# Patient Record
Sex: FEMALE | Race: ASIAN | NOT HISPANIC OR LATINO | Employment: STUDENT | URBAN - METROPOLITAN AREA
[De-identification: names, ages, dates, MRNs, and addresses within clinical notes are randomized per-mention and may not be internally consistent; named-entity substitution may affect disease eponyms.]

---

## 2017-11-20 ENCOUNTER — OFFICE VISIT (OUTPATIENT)
Dept: URGENT CARE | Facility: CLINIC | Age: 22
End: 2017-11-20
Payer: COMMERCIAL

## 2017-11-20 VITALS
DIASTOLIC BLOOD PRESSURE: 78 MMHG | OXYGEN SATURATION: 97 % | HEIGHT: 63 IN | BODY MASS INDEX: 37.21 KG/M2 | TEMPERATURE: 97.7 F | WEIGHT: 210 LBS | HEART RATE: 98 BPM | SYSTOLIC BLOOD PRESSURE: 116 MMHG | RESPIRATION RATE: 16 BRPM

## 2017-11-20 DIAGNOSIS — J30.9 ALLERGIC RHINITIS, UNSPECIFIED CHRONICITY, UNSPECIFIED SEASONALITY, UNSPECIFIED TRIGGER: ICD-10-CM

## 2017-11-20 DIAGNOSIS — R05.8 POST-VIRAL COUGH SYNDROME: ICD-10-CM

## 2017-11-20 DIAGNOSIS — J06.9 VIRAL UPPER RESPIRATORY TRACT INFECTION: ICD-10-CM

## 2017-11-20 PROCEDURE — 99203 OFFICE O/P NEW LOW 30 MIN: CPT | Performed by: EMERGENCY MEDICINE

## 2017-11-20 ASSESSMENT — ENCOUNTER SYMPTOMS
SHORTNESS OF BREATH: 0
DIARRHEA: 0
VOMITING: 0
CHILLS: 0
HEARTBURN: 0
HEADACHES: 0
MYALGIAS: 0
FEVER: 0
NAUSEA: 0
RHINORRHEA: 1
SORE THROAT: 1
HEMOPTYSIS: 0
WHEEZING: 1
COUGH: 1
SINUS PAIN: 0
SPUTUM PRODUCTION: 1

## 2017-11-20 NOTE — PATIENT INSTRUCTIONS
Use saline nasal irrigation, such as with a Neti Pot, as needed daily for relief of nasal or sinus congestion relief.  Continue over-the-counter inhaled nasal steroid (Flonase, Nasonex, Rhinocort, Nasacort) daily; continue for at least 2-3 weeks.  Add over-the-counter cetirizine (Zyrtec), fexofenadine (Allegra), or loratadine (Claritin) as needed for relief of symptoms.  Consider use of over-the-counter dextromethorphan (Benylin DM) 10-30 mg every 4-8 hours as needed for dry cough relief.  Cough, Adult   A cough is a reflex. It helps you clear your throat and airways. A cough can help heal your body. A cough can last 2 or 3 weeks (acute) or may last more than 8 weeks (chronic). Some common causes of a cough can include an infection, allergy, or a cold.  HOME CARE  · Only take medicine as told by your doctor.  · If given, take your medicines (antibiotics) as told. Finish them even if you start to feel better.  · Use a cold steam vaporizer or humidifier in your home. This can help loosen thick spit (secretions).  · Sleep so you are almost sitting up (semi-upright). Use pillows to do this. This helps reduce coughing.  · Rest as needed.  · Stop smoking if you smoke.  GET HELP RIGHT AWAY IF:  · You have yellowish-white fluid (pus) in your thick spit.  · Your cough gets worse.  · Your medicine does not reduce coughing, and you are losing sleep.  · You cough up blood.  · You have trouble breathing.  · Your pain gets worse and medicine does not help.  · You have a fever.  MAKE SURE YOU:   · Understand these instructions.  · Will watch your condition.  · Will get help right away if you are not doing well or get worse.     This information is not intended to replace advice given to you by your health care provider. Make sure you discuss any questions you have with your health care provider.     Document Released: 08/30/2012 Document Revised: 01/08/2016 Document Reviewed: 02/24/2016  [employer] Patient Education  ©2016 [employer]

## 2017-11-20 NOTE — PROGRESS NOTES
Subjective:      Donavon Ruff is a 22 y.o. female who presents with Cough (X 1 week )            Cough   This is a new problem. The current episode started in the past 7 days. The problem has been unchanged. The cough is non-productive. Associated symptoms include nasal congestion, rhinorrhea, a sore throat and wheezing. Pertinent negatives include no chest pain, chills, ear congestion, fever, headaches, heartburn, hemoptysis, myalgias, rash or shortness of breath. The symptoms are aggravated by lying down. She has tried OTC cough suppressant for the symptoms. The treatment provided no relief. Her past medical history is significant for environmental allergies. There is no history of asthma.   Onset of symptoms associated with recent coryzal illness, now resolving.    Review of Systems   Constitutional: Negative for chills and fever.   HENT: Positive for congestion, rhinorrhea and sore throat. Negative for nosebleeds and sinus pain.    Respiratory: Positive for cough, sputum production and wheezing. Negative for hemoptysis and shortness of breath.    Cardiovascular: Negative for chest pain.   Gastrointestinal: Negative for diarrhea, heartburn, nausea and vomiting.   Musculoskeletal: Negative for myalgias.   Skin: Negative for itching and rash.   Neurological: Negative for headaches.   Endo/Heme/Allergies: Positive for environmental allergies.       PMH:  has no past medical history on file.  MEDS:   Current Outpatient Prescriptions:   •  Albuterol Sulfate 108 (90 Base) MCG/ACT AEROSOL POWDER, BREATH ACTIVATED, Inhale 1-2 Puffs by mouth every 6 hours as needed., Disp: 1 Each, Rfl: 0  •  Fexofenadine HCl (MUCINEX ALLERGY PO), Take  by mouth., Disp: , Rfl:   •  fluticasone (FLONASE) 50 MCG/ACT nasal spray, Spray 1 Spray in nose 2 times a day., Disp: 16 g, Rfl: 0  •  benzonatate (TESSALON) 100 MG Cap, Take 1 Cap by mouth 3 times a day as needed for Cough., Disp: 30 Cap, Rfl: 0  •  valacyclovir (VALTREX) 1 GM  "Tab, Take 1 Tab by mouth 2 times a day., Disp: 20 Tab, Rfl: 0  ALLERGIES: No Known Allergies  SURGHX: History reviewed. No pertinent surgical history.  SOCHX:  reports that she has never smoked. She has never used smokeless tobacco. She reports that she does not drink alcohol or use drugs.  FH: family history is not on file.     Objective:     /78   Pulse 98   Temp 36.5 °C (97.7 °F)   Resp 16   Ht 1.6 m (5' 3\")   Wt 95.3 kg (210 lb)   SpO2 97%   BMI 37.20 kg/m²      Physical Exam   Constitutional: She appears well-developed and well-nourished. She is cooperative.  Non-toxic appearance. She does not appear ill. No distress.   HENT:   Head: Normocephalic.   Right Ear: Tympanic membrane and ear canal normal.   Left Ear: Tympanic membrane and ear canal normal.   Nose: Mucosal edema present. No rhinorrhea.   Mouth/Throat: Uvula is midline. No trismus in the jaw. No oropharyngeal exudate, posterior oropharyngeal edema or posterior oropharyngeal erythema.   Eyes: Conjunctivae are normal.   Neck: Trachea normal. Neck supple.   Cardiovascular: Normal rate, regular rhythm and normal heart sounds.    No significant pedal edema.   Pulmonary/Chest: Effort normal. She has no decreased breath sounds. She has no wheezes. She has no rhonchi. She has no rales.   Lymphadenopathy:     She has no cervical adenopathy.   Neurological: She is alert.   Skin: Skin is warm and dry.   Psychiatric: She has a normal mood and affect.               Assessment/Plan:     1. Viral upper respiratory tract infection  Recommended supportive care measures, including rest, increasing oral fluid intake and use of over-the-counter medications for relief of symptoms.    2. Allergic rhinitis, unspecified chronicity, unspecified seasonality, unspecified trigger  Recommended nasal saline irrigation daily, OTC inhaled nasal steroid daily, OTC nonsedating antihistamine when necessary as tolerated.    3. Post-viral cough syndrome  OTC dextromethorphan " prn  - Albuterol Sulfate 108 (90 Base) MCG/ACT AEROSOL POWDER, BREATH ACTIVATED; Inhale 1-2 Puffs by mouth every 6 hours as needed.  Dispense: 1 Each; Refill: 0